# Patient Record
Sex: FEMALE | Race: OTHER | HISPANIC OR LATINO | URBAN - METROPOLITAN AREA
[De-identification: names, ages, dates, MRNs, and addresses within clinical notes are randomized per-mention and may not be internally consistent; named-entity substitution may affect disease eponyms.]

---

## 2017-07-14 ENCOUNTER — EMERGENCY (EMERGENCY)
Facility: HOSPITAL | Age: 62
LOS: 1 days | Discharge: HOME | End: 2017-07-14
Attending: EMERGENCY MEDICINE | Admitting: EMERGENCY MEDICINE
Payer: MEDICAID

## 2017-07-14 VITALS
OXYGEN SATURATION: 96 % | TEMPERATURE: 98 F | SYSTOLIC BLOOD PRESSURE: 132 MMHG | DIASTOLIC BLOOD PRESSURE: 71 MMHG | RESPIRATION RATE: 14 BRPM | WEIGHT: 119.93 LBS | HEART RATE: 69 BPM

## 2017-07-14 VITALS
OXYGEN SATURATION: 97 % | TEMPERATURE: 98 F | HEART RATE: 70 BPM | SYSTOLIC BLOOD PRESSURE: 128 MMHG | RESPIRATION RATE: 14 BRPM | DIASTOLIC BLOOD PRESSURE: 70 MMHG
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
## 2017-07-14 DIAGNOSIS — S43.004A UNSPECIFIED DISLOCATION OF RIGHT SHOULDER JOINT, INITIAL ENCOUNTER: ICD-10-CM

## 2017-07-14 DIAGNOSIS — Z88.6 ALLERGY STATUS TO ANALGESIC AGENT: ICD-10-CM
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
## 2017-07-14 PROCEDURE — 73030 X-RAY EXAM OF SHOULDER: CPT

## 2017-07-14 PROCEDURE — 73030 X-RAY EXAM OF SHOULDER: CPT | Mod: 26,RT

## 2017-07-14 PROCEDURE — 23650 CLTX SHO DSLC W/MNPJ WO ANES: CPT

## 2017-07-14 PROCEDURE — 99285 EMERGENCY DEPT VISIT HI MDM: CPT | Mod: 25

## 2017-07-14 PROCEDURE — 23650 CLTX SHO DSLC W/MNPJ WO ANES: CPT | Mod: RT

## 2017-07-14 PROCEDURE — 99284 EMERGENCY DEPT VISIT MOD MDM: CPT | Mod: 25

## 2017-07-14 PROCEDURE — 73080 X-RAY EXAM OF ELBOW: CPT

## 2017-07-14 PROCEDURE — 73080 X-RAY EXAM OF ELBOW: CPT | Mod: 26,RT

## 2017-07-14 RX ORDER — OXYCODONE AND ACETAMINOPHEN 5; 325 MG/1; MG/1
1 TABLET ORAL ONCE
Qty: 0 | Refills: 0 | Status: DISCONTINUED | OUTPATIENT
Start: 2017-07-14 | End: 2017-07-14

## 2017-07-14 RX ADMIN — OXYCODONE AND ACETAMINOPHEN 1 TABLET(S): 5; 325 TABLET ORAL at 15:23

## 2017-07-14 RX ADMIN — OXYCODONE AND ACETAMINOPHEN 1 TABLET(S): 5; 325 TABLET ORAL at 14:53

## 2017-07-14 NOTE — ED PROVIDER NOTE - OBJECTIVE STATEMENT
family xlator per pt request  pt c/o right elbow pain s/p fall onto right elbow while slipped off small stool. no head injury, ha, neck or abck pain, leg pain, weakness, numbness, cp, sob, abd pain.  pmd - candida catucci

## 2017-07-14 NOTE — ED PROVIDER NOTE - CONSTITUTIONAL, MLM
normal... Well appearing, well nourished, awake, alert, oriented to person, place, time/situation, crying and in mild distress.

## 2017-07-14 NOTE — ED ADULT NURSE NOTE - OBJECTIVE STATEMENT
Pt presents to the subacute area s/p fall c/o right elbow pain, skin warm and dry + sensation, + capillary refill < 2 sec, + mobility of the fingers.

## 2017-07-14 NOTE — ED PROVIDER NOTE - CARE PLAN
Principal Discharge DX:	Elbow injury, right, initial encounter Principal Discharge DX:	Shoulder dislocation, right, initial encounter

## 2017-07-14 NOTE — CONSULT NOTE ADULT - ASSESSMENT
A/P: 62F s/p R shoulder dislocation  Pain control  NWB RUE, Sling    Ice as needed  All imaging reviewed  All questions answered with patient  Encourage motion of elbow/fingers  D/W patient, no need for acute orthopedic surgical intervention  Will discuss with attending and advise if plan changes  Pt to follow up with Dr. Ferrer in 1-2 weeks, call office for appt  Ortho stable for DC

## 2017-07-14 NOTE — CONSULT NOTE ADULT - SUBJECTIVE AND OBJECTIVE BOX
62F RHD c/o R shoulder pain s/p controlled fall. Pt states she was cleaning and stumbled, twisting her R arm. Pt primarily Tajik speaking, understands basic english. Translation done through her friend. Denies HS/LOC. Denies numbness/tingling.     PAST MEDICAL & SURGICAL HISTORY:  No pertinent past medical history    Denies medications    Allergies    aspirin (Other)    Intolerances    Vital Signs Last 24 Hrs  T(C): 36.6 (14 Jul 2017 14:34), Max: 36.6 (14 Jul 2017 14:34)  T(F): 97.8 (14 Jul 2017 14:34), Max: 97.8 (14 Jul 2017 14:34)  HR: 69 (14 Jul 2017 14:34) (69 - 69)  BP: 132/71 (14 Jul 2017 14:34) (132/71 - 132/71)  BP(mean): --  RR: 14 (14 Jul 2017 14:34) (14 - 14)  SpO2: 96% (14 Jul 2017 14:34) (96% - 96%)    PE: Gen: NAD  RUE: Skin intact, no edema/ecchymosis, SILT C5-T1, SILT over R deltoid, +AIN/PIN/R/U/M, +radial pulse, cap refill brisk, compartments soft, + sulcus sign, humeral head palpable anteriorly    Secondary Survey: No TTP over bony prominences, SILT, palpable pulses, full/painless range of motion, compartments soft     XR R Shldr: Anterior inferior shoulder dislocation      Procedure note: Using sterile technique, the R shoulder joint was injected with 20cc lidocaine. Shoulder was reduced using traction/counter traction. Patient tolerated procedure well. Post reduction films confirmed reduction. NVI following procedure. No complications.

## 2018-03-23 ENCOUNTER — EMERGENCY (EMERGENCY)
Facility: HOSPITAL | Age: 63
LOS: 1 days | Discharge: HOME | End: 2018-03-23
Attending: EMERGENCY MEDICINE | Admitting: EMERGENCY MEDICINE
Payer: MEDICAID

## 2018-03-23 VITALS
RESPIRATION RATE: 16 BRPM | HEART RATE: 78 BPM | SYSTOLIC BLOOD PRESSURE: 138 MMHG | DIASTOLIC BLOOD PRESSURE: 70 MMHG | OXYGEN SATURATION: 96 %

## 2018-03-23 VITALS
HEIGHT: 60 IN | SYSTOLIC BLOOD PRESSURE: 153 MMHG | HEART RATE: 82 BPM | DIASTOLIC BLOOD PRESSURE: 78 MMHG | RESPIRATION RATE: 16 BRPM | TEMPERATURE: 98 F | WEIGHT: 125 LBS | OXYGEN SATURATION: 96 %

## 2018-03-23 PROCEDURE — 23650 CLTX SHO DSLC W/MNPJ WO ANES: CPT | Mod: RT

## 2018-03-23 PROCEDURE — 99285 EMERGENCY DEPT VISIT HI MDM: CPT | Mod: 25

## 2018-03-23 PROCEDURE — 73030 X-RAY EXAM OF SHOULDER: CPT

## 2018-03-23 PROCEDURE — 73020 X-RAY EXAM OF SHOULDER: CPT

## 2018-03-23 PROCEDURE — 73030 X-RAY EXAM OF SHOULDER: CPT | Mod: 26,RT

## 2018-03-23 PROCEDURE — 99283 EMERGENCY DEPT VISIT LOW MDM: CPT

## 2018-03-23 PROCEDURE — 73020 X-RAY EXAM OF SHOULDER: CPT | Mod: 26,RT

## 2018-03-23 PROCEDURE — 73030 X-RAY EXAM OF SHOULDER: CPT | Mod: 26,77,RT

## 2018-03-23 RX ORDER — OXYCODONE AND ACETAMINOPHEN 5; 325 MG/1; MG/1
1 TABLET ORAL ONCE
Qty: 0 | Refills: 0 | Status: DISCONTINUED | OUTPATIENT
Start: 2018-03-23 | End: 2018-03-23

## 2018-03-23 RX ADMIN — OXYCODONE AND ACETAMINOPHEN 1 TABLET(S): 5; 325 TABLET ORAL at 11:50

## 2018-03-23 RX ADMIN — OXYCODONE AND ACETAMINOPHEN 1 TABLET(S): 5; 325 TABLET ORAL at 12:20

## 2018-03-23 NOTE — ED PROVIDER NOTE - OBJECTIVE STATEMENT
63 y/o female with no significant PMHx presents today with c/o right shoulder pain x 1 hour. pt states she was reaching for an object in which she noted pain and decreased ROM. pt states she has hx of dislocation 8 months ago. pt describes pain as sharp, non-radiating, and currently 10/10. pt denies fall, trauma, CP, SOB, extremity numbess/weakness, or any other complaints.

## 2018-03-23 NOTE — ED ADULT TRIAGE NOTE - CHIEF COMPLAINT QUOTE
Pt reports possible Right shoulder dislocation after reaching out with arm, denies falls or traumas, states that a previous time this happened she came here and was told she had a shoulder dislocation

## 2018-03-23 NOTE — ED PROVIDER NOTE - CONDUCTED A DETAILED DISCUSSION WITH PATIENT AND/OR GUARDIAN REGARDING, MDM
radiology results return to ED if symptoms worsen, persist or questions arise/radiology results/need for outpatient follow-up

## 2018-03-23 NOTE — ED PROVIDER NOTE - MUSCULOSKELETAL MINIMAL EXAM
(+) right shoulder TTP, (+) limited active ROM, no wrist drop, FROM distal extremity, hand  intact, pulses intact

## 2018-03-23 NOTE — ED PROVIDER NOTE - PROGRESS NOTE DETAILS
Ortho team reduced shoulder. Noted significant improvement. Advised to provide sling, and f/u with Dr. Ferrer in 1 week.

## 2018-03-23 NOTE — ED ADULT NURSE NOTE - CAS DISCH CONDITION
Blepharoplasty Recommended. Improved Improved/pt seen, examined, eval and tx'd by ortho residents, post reduction xray done, right arm placed in a sling, + neurovasculars checks done

## 2018-03-23 NOTE — ED PROVIDER NOTE - ATTENDING CONTRIBUTION TO CARE
pt with hx right shoulder dislocation c/o pain to shoulder s/p reaching for something with right arm and feeling joint pop out of place. no weakness or numbness or any other injury.  rue shoulder tender, decreased rom, distal n/v intact, cap refill < 2 sec

## 2018-03-23 NOTE — ED PROVIDER NOTE - MEDICAL DECISION MAKING DETAILS
63 y/o female with no significant PMHx presents today with shoulder pain with suspected dislocation. Discussed with Dr. hester, pending Xray. Likely reduction. 63 y/o female with no significant PMHx presents today with shoulder pain with suspected dislocation. Discussed with Dr. hester, pending Xray. Likely reduction.    Shoulder reduced by ortho team. Sling applied, advised to follow up in 1 week with Dr beltrán. Pt educated on nature of condition and importance to follow up with PCp and specialist.

## 2018-03-23 NOTE — CONSULT NOTE ADULT - SUBJECTIVE AND OBJECTIVE BOX
62yFemale c/o R shoulder pain  s/p moving her arm extended and outreached and felt a pop. The patient denies any trauma but reports that she dislocated her right shoulder in august of 2017. The patient speaks Greek and translation was performed at patient request by her niece. Patient denies head hit or LOC. Patient denies numbness or tingling in the RUE. Patient denies any other injuries.    PMH:  Shoulder dislocation, right,   No pertinent past medical history    PSH:    AH:  aspirin (Other)    Meds: See med rec    T(C): 36.4 (03-23-18 @ 11:15)  HR: 82 (03-23-18 @ 11:15)  BP: 153/78 (03-23-18 @ 11:15)  RR: 16 (03-23-18 @ 11:15)  SpO2: 96% (03-23-18 @ 11:15)  Wt(kg): --    PE RUE:  Skin intact, + sulcus sign, SILT C5-T1, +AIN/PIN/Ulnar/Radial/Musc/Median, +elbow/wrist ROM, shoulder ROM limited 2/2 pain, +radial pulse, soft compartments.    Secondary:  No TTP over bony landmarks, SILT BL, ROM intact BL, distal pulses palpable.    Imaging:  XR demonstrating R shoulder anterior dislocation    Procedure: Right shoulder injected with 20cc 1% lidocaine, right shoulder reduced with traction counter traction, post reduction examination unchanged, post-reduction imaging shows reduction of glenohumeral joint. Patient tolerated procedure well, no complications.

## 2018-03-23 NOTE — CONSULT NOTE ADULT - ASSESSMENT
A/P: 62 Female with Right shoulder recurrent dislocation reduced successfully  Pain control  Ice as needed  NWB RUE in sling  Encourage movement of elbow and fingers  No acute orthopedic surgical intervention at this time  Ortho stable for discharge with follow up with Dr. Ferrer in 1-2 weeks call office for appointment  All questions answered

## 2018-12-18 ENCOUNTER — EMERGENCY (EMERGENCY)
Facility: HOSPITAL | Age: 63
LOS: 1 days | End: 2018-12-18
Attending: EMERGENCY MEDICINE
Payer: MEDICARE

## 2018-12-18 VITALS
HEART RATE: 76 BPM | DIASTOLIC BLOOD PRESSURE: 83 MMHG | OXYGEN SATURATION: 92 % | TEMPERATURE: 98 F | SYSTOLIC BLOOD PRESSURE: 136 MMHG | RESPIRATION RATE: 15 BRPM

## 2018-12-18 PROBLEM — S43.004A UNSPECIFIED DISLOCATION OF RIGHT SHOULDER JOINT, INITIAL ENCOUNTER: Chronic | Status: ACTIVE | Noted: 2018-03-23

## 2018-12-18 PROCEDURE — 99284 EMERGENCY DEPT VISIT MOD MDM: CPT | Mod: 25

## 2018-12-18 PROCEDURE — 23650 CLTX SHO DSLC W/MNPJ WO ANES: CPT | Mod: RT

## 2018-12-18 PROCEDURE — 73030 X-RAY EXAM OF SHOULDER: CPT | Mod: 26,RT,76

## 2018-12-18 PROCEDURE — 73030 X-RAY EXAM OF SHOULDER: CPT

## 2018-12-18 PROCEDURE — 23650 CLTX SHO DSLC W/MNPJ WO ANES: CPT

## 2018-12-18 RX ORDER — OXYCODONE AND ACETAMINOPHEN 5; 325 MG/1; MG/1
1 TABLET ORAL ONCE
Qty: 0 | Refills: 0 | Status: DISCONTINUED | OUTPATIENT
Start: 2018-12-18 | End: 2018-12-18

## 2018-12-18 RX ORDER — LIDOCAINE HCL 20 MG/ML
10 VIAL (ML) INJECTION ONCE
Qty: 0 | Refills: 0 | Status: DISCONTINUED | OUTPATIENT
Start: 2018-12-18 | End: 2018-12-22

## 2018-12-18 RX ADMIN — OXYCODONE AND ACETAMINOPHEN 1 TABLET(S): 5; 325 TABLET ORAL at 09:20

## 2018-12-18 NOTE — ED PROVIDER NOTE - CARE PROVIDER_API CALL
Carlos Harding), Orthopaedic Surgery  [address] [place] [address]  Phone: [phone]  Fax: [phone]

## 2018-12-18 NOTE — ED PROVIDER NOTE - OBJECTIVE STATEMENT
[id number] Charlie  62 yo female hx of right shoulder dislocation c/o right shoulder dislocation s/p mechanical fall out of bed, was holding onto railing next to her with her right hand.  BIBEMS for evaluation.  Patient Iranian speaking.  Denies any head injury, neck pain.  No chest pain, no shortness of breath, no other complaints.

## 2018-12-18 NOTE — ED PROVIDER NOTE - NSFOLLOWUPINSTRUCTIONS_ED_ALL_ED_FT
1) Follow-up with Dr. Harding. Call today / next business day for prompt follow-up.  2) Return to Emergency room for any worsening or persistent pain, weakness, fever, or any other concerning symptoms.  3) See attached instruction sheets for additional information, including information regarding signs and symptoms to look out for, reasons to seek immediate care and other important instructions.  4) Motrin 600mg every 6 hours as needed for pain.  Take with food.

## 2018-12-18 NOTE — ED PROVIDER NOTE - PHYSICAL EXAMINATION
Gen: Alert, NAD  Head/eyes: NC/AT, PERRL, EOMI, normal lids/conjunctiva, no scleral icterus  ENT: Bilateral TM WNL, normal hearing, patent oropharynx without erythema/exudate, uvula midline, no peritonsillar abscess, no tongue/uvula swelling  Neck: supple, no tenderness/meningismus/JVD, Trachea midline  Pulm: Bilateral clear BS, normal resp effort, no wheeze/stridor/retractions  CV: RRR, no M/R/G, +2 dist pulses (radial, pedal DP/PT, popliteal)  Abd: soft, NT/ND, +BS, no guarding/rebound tenderness  Musculoskeletal: no edema/erythema/cyanosis, FROM in all extremities except of right shoulder secondary to pain, no C/T/L spine ttp  Skin: no rash, no vesicles, no petechaie, no ecchymosis, no swelling  Neuro: AAOx3, CN 2-12 intact, normal sensation, 5/5 motor strength in all extremities, normal gait, no dysmetria

## 2018-12-18 NOTE — ED ADULT NURSE NOTE - CHPI ED NUR SYMPTOMS NEG
no numbness/no bruising/no deformity/no fever/no difficulty bearing weight/no abrasion/no stiffness/no tingling/no back pain

## 2018-12-18 NOTE — ED PROVIDER NOTE - NS ED ROS FT

## 2019-12-13 ENCOUNTER — EMERGENCY (EMERGENCY)
Facility: HOSPITAL | Age: 64
LOS: 1 days | Discharge: HOME | End: 2019-12-13
Attending: EMERGENCY MEDICINE | Admitting: EMERGENCY MEDICINE
Payer: MEDICARE

## 2019-12-13 VITALS
SYSTOLIC BLOOD PRESSURE: 149 MMHG | TEMPERATURE: 98 F | DIASTOLIC BLOOD PRESSURE: 82 MMHG | OXYGEN SATURATION: 93 % | HEART RATE: 86 BPM | RESPIRATION RATE: 15 BRPM | HEIGHT: 59 IN | WEIGHT: 126.1 LBS

## 2019-12-13 VITALS
HEART RATE: 83 BPM | RESPIRATION RATE: 16 BRPM | SYSTOLIC BLOOD PRESSURE: 115 MMHG | TEMPERATURE: 98 F | OXYGEN SATURATION: 98 % | DIASTOLIC BLOOD PRESSURE: 71 MMHG

## 2019-12-13 PROCEDURE — 99285 EMERGENCY DEPT VISIT HI MDM: CPT

## 2019-12-13 PROCEDURE — 23650 CLTX SHO DSLC W/MNPJ WO ANES: CPT

## 2019-12-13 PROCEDURE — 73030 X-RAY EXAM OF SHOULDER: CPT

## 2019-12-13 PROCEDURE — 73020 X-RAY EXAM OF SHOULDER: CPT

## 2019-12-13 PROCEDURE — 73030 X-RAY EXAM OF SHOULDER: CPT | Mod: 26,76,RT

## 2019-12-13 PROCEDURE — 23650 CLTX SHO DSLC W/MNPJ WO ANES: CPT | Mod: RT

## 2019-12-13 PROCEDURE — 99284 EMERGENCY DEPT VISIT MOD MDM: CPT | Mod: 25

## 2019-12-13 PROCEDURE — 96374 THER/PROPH/DIAG INJ IV PUSH: CPT | Mod: XU

## 2019-12-13 RX ORDER — MORPHINE SULFATE 50 MG/1
4 CAPSULE, EXTENDED RELEASE ORAL ONCE
Refills: 0 | Status: DISCONTINUED | OUTPATIENT
Start: 2019-12-13 | End: 2019-12-13

## 2019-12-13 RX ADMIN — MORPHINE SULFATE 4 MILLIGRAM(S): 50 CAPSULE, EXTENDED RELEASE ORAL at 17:20

## 2019-12-13 NOTE — ED PROVIDER NOTE - CARE PROVIDER_API CALL
Edgar Santa)  Orthopaedic Surgery  [address] [place] [address]  Phone: [phone]  Fax: [phone]  Follow Up Time:

## 2019-12-13 NOTE — ED ADULT NURSE NOTE - NSIMPLEMENTINTERV_GEN_ALL_ED
Implemented All Universal Safety Interventions:  [place] to call system. Call bell, personal items and telephone within reach. Instruct patient to call for assistance. Room bathroom lighting operational. Non-slip footwear when patient is off stretcher. Physically safe environment: no spills, clutter or unnecessary equipment. Stretcher in lowest position, wheels locked, appropriate side rails in place.

## 2019-12-13 NOTE — CONSULT NOTE ADULT - SUBJECTIVE AND OBJECTIVE BOX
64yFemale c/o R shoulder pain  s/p outstretching her arm. Patient has had prior dislocations before. Patient denies head strike or LOC. Patient denies numbness or tingling in the RUE. Patient denies any other injuries.    PMH:  Shoulder dislocation, right, initial encounter  No pertinent past medical history    PSH:    AH:  aspirin (Other)    Meds: See med rec    T(C): 36.6 (12-13-19 @ 16:57)  HR: 86 (12-13-19 @ 16:57)  BP: 149/82 (12-13-19 @ 16:57)  RR: 15 (12-13-19 @ 16:57)  SpO2: 93% (12-13-19 @ 16:57)  Wt(kg): --    Physical Exam:  GEN: NAD  RUE:  Skin intact, + sulcus sign, SILT C5-T1, +AIN/PIN/Ulnar/Radial/Musc/Median, +elbow/wrist ROM, shoulder ROM limited 2/2 pain, +radial pulse, soft compartments.    Secondary:  No TTP over bony landmarks, SILT BL, ROM intact BL, distal pulses palpable.    Imaging:  XR demonstrating R anterior shoulder dislocation     Procedure:  Under aspetic conditions, 20cc 1% lidocaine was injected into the affected shoulder joint. Closed reduction was then performed and the affected extremity was immobilized. The patient tolerated the procedure well and there we no complications. The patient was neurovascularly intact following reduction. Post-reduction xrays demonstrated a reduced shoulder. 64yFemale c/o R shoulder pain  s/p outstretching her arm. Patient is right handed. Patient has had prior dislocations before. Patient denies head strike or LOC. Patient denies numbness or tingling in the RUE. Patient denies any other injuries.    PMH:  Shoulder dislocation, right, initial encounter  No pertinent past medical history    PSH:    AH:  aspirin (Other)    Meds: See med rec    T(C): 36.6 (12-13-19 @ 16:57)  HR: 86 (12-13-19 @ 16:57)  BP: 149/82 (12-13-19 @ 16:57)  RR: 15 (12-13-19 @ 16:57)  SpO2: 93% (12-13-19 @ 16:57)  Wt(kg): --    Physical Exam:  GEN: NAD  RUE:  Skin intact, + sulcus sign, + apprehension sign, SILT C5-T1, +AIN/PIN/Ulnar/Radial/Musc/Median, +elbow/wrist ROM, shoulder ROM limited 2/2 pain, +radial pulse, soft compartments.    Secondary:  No TTP over bony landmarks, SILT BL, ROM intact BL, distal pulses palpable.    Imaging:  XR demonstrating R anterior shoulder dislocation     Procedure:  Under aspetic conditions, 20cc 1% lidocaine was injected into the affected shoulder joint. Closed reduction was then performed and the affected extremity was immobilized. The patient tolerated the procedure well and there we no complications. The patient was neurovascularly intact following reduction. Post-reduction xrays demonstrated a reduced shoulder.

## 2019-12-13 NOTE — CONSULT NOTE ADULT - ASSESSMENT
64 F with R anterior shoulder reduction s/p closed reduction    NWB RUE in shoulder immobilizer  ICE affected extremity  Analgesia as needed  No shoulder flexion, abduction external rotating  Patient counseled on possible need of future surgery due to prior history of shoulder dislocation, patient demonstrated understanding  Patient may follow up with Dr. Santa in office, please call office to make an appointment  Patient ortho stable for DC  No further orthopedic interventions at this time  Will discuss with attending and advise further if plan changes 64 F with R anterior shoulder reduction s/p closed reduction    NWB RUE in shoulder immobilizer  ICE affected extremity  Analgesia as needed  No shoulder flexion, abduction external rotating  Patient counseled on possible need of future surgery due to prior history of multiple shoulder dislocation, patient demonstrated understanding  Patient may follow up with Dr. Santa in office, please call office to make an appointment  Patient ortho stable for DC  No further orthopedic interventions at this time  Will discuss with attending and advise further if plan changes

## 2019-12-13 NOTE — ED PROVIDER NOTE - PHYSICAL EXAMINATION
Gen: Alert, NAD  Head/eyes: NC/AT, PERRL  ENT: airway patent  Neck: supple, no tenderness/meningismus/JVD, Trachea midline  Pulm/lung: Bilateral clear BS, normal resp effort, no wheeze/stridor/retractions  CV/heart: RRR, no M/R/G, +2 dist pulses (radial, pedal DP/PT, popliteal)  GI/Abd: soft, NT/ND, +BS, no guarding/rebound tenderness  Musculoskeletal: no edema/erythema/cyanosis, FROM in all extremities except right shoulder secondary to pain no C/T/L spine ttp  Skin: no rash, no vesicles, no petechaie, no ecchymosis, no swelling  Neuro: AAOx3, CN 2-12 intact, normal sensation, 5/5 motor strength in all extremities, normal gait, no dysmetria

## 2019-12-13 NOTE — ED PROVIDER NOTE - PATIENT PORTAL LINK FT
You can access the FollowMyHealth Patient Portal offered by [facility] by registering at the following website: [email or web address]. By joining [employer]’s FollowMyHealth portal, you will also be able to view your health information using other applications (apps) compatible with our system.

## 2019-12-13 NOTE — ED PROVIDER NOTE - NSFOLLOWUPINSTRUCTIONS_ED_ALL_ED_FT
1) Follow-up with Dr. Martin. Call today / next business day for prompt follow-up.  2) Return to Emergency room for any worsening or persistent pain, weakness, fever, or any other concerning symptoms.  3) See attached instruction sheets for additional information, including information regarding signs and symptoms to look out for, reasons to seek immediate care and other important instructions.  4) Motrin 600mg every 6 hours as needed for pain 1) Follow-up with Dr. Santa. Call today / next business day for prompt follow-up.  2) Return to Emergency room for any worsening or persistent pain, weakness, fever, or any other concerning symptoms.  3) See attached instruction sheets for additional information, including information regarding signs and symptoms to look out for, reasons to seek immediate care and other important instructions.  4) Motrin 600mg every 6 hours as needed for pain

## 2019-12-13 NOTE — ED PROVIDER NOTE - OBJECTIVE STATEMENT
63 yo female hx of right shoulder dislocation s/p closing door with right hand c/o sudden onset right shoulder pain, nonradiating, moderate to severe, no medications taken for this, feels like her previous dislocation

## 2019-12-13 NOTE — ED ADULT NURSE NOTE - OBJECTIVE STATEMENT
patient came in ED with c/o right shoulder pain. patient states she was closing the door of her car when she felt the shoulder pop out. 10/10 pain level. sling provided.

## 2019-12-26 ENCOUNTER — FORM ENCOUNTER (OUTPATIENT)
Age: 64
End: 2019-12-26

## 2019-12-26 PROBLEM — Z00.00 ENCOUNTER FOR PREVENTIVE HEALTH EXAMINATION: Status: ACTIVE | Noted: 2019-12-26

## 2019-12-27 ENCOUNTER — APPOINTMENT (OUTPATIENT)
Dept: MRI IMAGING | Facility: CLINIC | Age: 64
End: 2019-12-27

## 2019-12-27 ENCOUNTER — OUTPATIENT (OUTPATIENT)
Dept: OUTPATIENT SERVICES | Facility: HOSPITAL | Age: 64
LOS: 1 days | End: 2019-12-27
Payer: MEDICARE

## 2019-12-27 ENCOUNTER — APPOINTMENT (OUTPATIENT)
Dept: ORTHOPEDIC SURGERY | Facility: CLINIC | Age: 64
End: 2019-12-27
Payer: MEDICARE

## 2019-12-27 VITALS
HEART RATE: 74 BPM | BODY MASS INDEX: 23.56 KG/M2 | SYSTOLIC BLOOD PRESSURE: 116 MMHG | HEIGHT: 60 IN | WEIGHT: 120 LBS | DIASTOLIC BLOOD PRESSURE: 77 MMHG

## 2019-12-27 DIAGNOSIS — Z56.0 UNEMPLOYMENT, UNSPECIFIED: ICD-10-CM

## 2019-12-27 DIAGNOSIS — Z72.3 LACK OF PHYSICAL EXERCISE: ICD-10-CM

## 2019-12-27 DIAGNOSIS — Z78.9 OTHER SPECIFIED HEALTH STATUS: ICD-10-CM

## 2019-12-27 DIAGNOSIS — S43.006A UNSPECIFIED DISLOCATION OF UNSPECIFIED SHOULDER JOINT, INITIAL ENCOUNTER: ICD-10-CM

## 2019-12-27 DIAGNOSIS — Z00.8 ENCOUNTER FOR OTHER GENERAL EXAMINATION: ICD-10-CM

## 2019-12-27 PROCEDURE — 73221 MRI JOINT UPR EXTREM W/O DYE: CPT | Mod: 26,RT

## 2019-12-27 PROCEDURE — 73221 MRI JOINT UPR EXTREM W/O DYE: CPT

## 2019-12-27 PROCEDURE — 99204 OFFICE O/P NEW MOD 45 MIN: CPT

## 2019-12-27 SDOH — ECONOMIC STABILITY - INCOME SECURITY: UNEMPLOYMENT, UNSPECIFIED: Z56.0

## 2019-12-29 PROBLEM — S43.006A DISLOCATED SHOULDER: Status: ACTIVE | Noted: 2019-12-27

## 2020-01-02 ENCOUNTER — APPOINTMENT (OUTPATIENT)
Dept: ORTHOPEDIC SURGERY | Facility: CLINIC | Age: 65
End: 2020-01-02
Payer: MEDICARE

## 2020-01-02 VITALS
BODY MASS INDEX: 23.56 KG/M2 | HEIGHT: 60 IN | WEIGHT: 120 LBS | HEART RATE: 98 BPM | SYSTOLIC BLOOD PRESSURE: 120 MMHG | DIASTOLIC BLOOD PRESSURE: 74 MMHG

## 2020-01-02 DIAGNOSIS — M24.411 RECURRENT DISLOCATION, RIGHT SHOULDER: ICD-10-CM

## 2020-01-02 DIAGNOSIS — S46.011D STRAIN OF MUSCLE(S) AND TENDON(S) OF THE ROTATOR CUFF OF RIGHT SHOULDER, SUBSEQUENT ENCOUNTER: ICD-10-CM

## 2020-01-02 PROCEDURE — 99213 OFFICE O/P EST LOW 20 MIN: CPT

## 2020-01-02 NOTE — PHYSICAL EXAM
[Rad] : radial 2+ and symmetric bilaterally [Normal] : Alert and in no acute distress [Poor Appearance] : well-appearing [Acute Distress] : not in acute distress [Obese] : not obese [de-identified] : The patient has no respiratory distress. Mood and affect are normal. The patient is alert and oriented to person, place and time.\par Examination of the cervical spine demonstrates no tenderness, no deformity and no muscle spasm. Cervical spine rotation is 60° to the right, 60° to the left, 75° of extension and 45° of flexion. Neurologic exam of the upper extremities reveals intact sensation to light touch. Motor function is 5 over 5 in all groups. Deep tendon reflexes are 2+ and equal at the biceps, triceps and brachioradialis.\par Examination of the shoulders demonstrates no swelling or deformity.  The right shoulder is clinically located.  She has restricted elevation and she has apprehension with abduction and external rotation.  Drop arm test is negative.  Belly press test is negative.  External rotation strength is good.  The elbows are stable and nontender.  The skin is intact.  There is no lymphedema. [de-identified] : EXAM: MR SHOULDER RT \par \par \par PROCEDURE DATE: 12/27/2019 \par \par \par \par INTERPRETATION: \par RIGHT SHOULDER MRI \par \par CLINICAL INFORMATION: Right shoulder pain. Injury on 12/13/2019. Prior \par dislocation. \par TECHNIQUE: Multiplanar, multisequence MR imaging was obtained of the right \par shoulder. \par COMPARISON: Shoulder radiographs dated 12/13/2019 \par \par FINDINGS: \par \par ROTATOR CUFF: There is a full-thickness, full width tear of the \par supraspinatus that extends into the anterior midportion of the \par infraspinatus. There is moderate retraction of the supraspinatus measuring \par approximately 4.2 cm. Suspect a rounded fragment of bone within the \par retracted fibers of the supraspinatus posteriorly measuring up to 0.8 cm in \par AP diameter. The subscapularis and teres minor are intact. \par BICEPS TENDON: The intra-articular biceps tendon is poorly seen suggesting \par high-grade partial tearing \par AC JOINT: Moderate lateral downsloping of the acromion. \par GLENOID LABRUM AND GLENOHUMERAL LIGAMENTS: Blunting and chronic appearing \par tearing of the posterior labrum extending superiorly. \par GLENOHUMERAL CARTILAGE AND SUBCHONDRAL BONE: Preserved \par SYNOVIUM/JOINT FLUID: Small minimal joint effusion. Small fluid in the \par subacromial/subdeltoid bursa. \par BONE MARROW: Acute/subacute Hill-Sachs deformity at the posterolateral \par humeral head measuring to 1.6 x 1.4 cm. Moderate edema within the right. No \par definite osseous Bankart injury though limited by MRI technique. \par MUSCLES: Moderate to severe atrophy of the infraspinatus. Mild atrophy of \par the supraspinatus. \par NEUROVASCULAR STRUCTURES: Preserved \par SUBCUTANEOUS SOFT TISSUES: There is no soft tissue swelling. \par \par IMPRESSION: \par \par 1. Subacute to acute shoulder dislocation with relocation and residual \par moderate Hill-Sachs deformity with underlying edema. No definite osseous \par Bankart injury. \par 2. Atrophy of the supraspinatus and infraspinatus. \par 3. Full-thickness, full tear in the supraspinatus extending into the \par anterior portion of the infraspinatus. Retraction as detailed above. \par 4. High-grade partial tear of the intra-articular biceps tendon. \par 5. Mild subacromial/subdeltoid bursitis. \par \par NICOLE MILLER M.D., ATTENDING RADIOLOGIST \par This document has been electronically signed. Dec 30 2019 2:00PM

## 2020-01-02 NOTE — HISTORY OF PRESENT ILLNESS
[de-identified] : This 65 yo RHD woman presents with c/o decreased ROM on right shoulder since 12/13/19. Pt states she was trying to close the door of her car, and after pushing the door she dislocated her right shoulder. She states that in 2017 she had her right shoulder dislocated after a fall. Pt states she had 4 similar episodes in the past. On 12/13/19 pt went to [place] ER and her shoulder was reducede. Xrays were done. Pt is not taking meds for pain at this time. Pt states he has no pain, she denies radiating pain, numbness,tingling on RUE.  She had an MRI on 12/27/19 and she presents to discuss results.\par

## 2020-01-02 NOTE — DISCUSSION/SUMMARY
[de-identified] : The patient has recurrent dislocation of the right shoulder on the basis of rotator cuff tear.  She has had 4 dislocations and no improvement with physical therapy.  I have discussed the pathology, natural history and treatment options with her.  I have recommended she and she has accepted arthroscopic surgery and rotator cuff repair.  I have gone over the procedure, risks, benefits and alternatives.  We have discussed the need for postoperative immobilization and physical therapy.  She understands and wishes to proceed.

## 2020-03-27 ENCOUNTER — APPOINTMENT (OUTPATIENT)
Dept: ORTHOPEDIC SURGERY | Facility: HOSPITAL | Age: 65
End: 2020-03-27

## 2020-05-14 NOTE — ED PROVIDER NOTE - NEUROLOGICAL, MLM
Called and spoke to Patient after verifying last name and date of birth. Pt confirmed it is OK TO WAIT FOR DR. FINE'S RETURN ON Monday 5/18/20.    Fanta Hilton RN .............. 5/14/2020  11:43 AM     Alert and oriented, no focal deficits, no motor or sensory deficits.

## 2021-11-19 ENCOUNTER — EMERGENCY (EMERGENCY)
Facility: HOSPITAL | Age: 66
LOS: 1 days | Discharge: HOME | End: 2021-11-19
Attending: EMERGENCY MEDICINE | Admitting: EMERGENCY MEDICINE
Payer: MEDICARE

## 2021-11-19 VITALS
OXYGEN SATURATION: 98 % | HEIGHT: 59 IN | RESPIRATION RATE: 16 BRPM | WEIGHT: 123.02 LBS | TEMPERATURE: 97 F | DIASTOLIC BLOOD PRESSURE: 74 MMHG | HEART RATE: 74 BPM | SYSTOLIC BLOOD PRESSURE: 145 MMHG

## 2021-11-19 VITALS
TEMPERATURE: 98 F | HEART RATE: 70 BPM | SYSTOLIC BLOOD PRESSURE: 122 MMHG | RESPIRATION RATE: 16 BRPM | OXYGEN SATURATION: 98 % | DIASTOLIC BLOOD PRESSURE: 72 MMHG

## 2021-11-19 PROCEDURE — 73020 X-RAY EXAM OF SHOULDER: CPT

## 2021-11-19 PROCEDURE — 96374 THER/PROPH/DIAG INJ IV PUSH: CPT | Mod: XU

## 2021-11-19 PROCEDURE — 99284 EMERGENCY DEPT VISIT MOD MDM: CPT

## 2021-11-19 PROCEDURE — 73030 X-RAY EXAM OF SHOULDER: CPT | Mod: 26,RT,76

## 2021-11-19 PROCEDURE — 96375 TX/PRO/DX INJ NEW DRUG ADDON: CPT | Mod: XU

## 2021-11-19 PROCEDURE — 73030 X-RAY EXAM OF SHOULDER: CPT

## 2021-11-19 PROCEDURE — 23650 CLTX SHO DSLC W/MNPJ WO ANES: CPT | Mod: RT

## 2021-11-19 PROCEDURE — 99284 EMERGENCY DEPT VISIT MOD MDM: CPT | Mod: 25

## 2021-11-19 PROCEDURE — 73020 X-RAY EXAM OF SHOULDER: CPT | Mod: 26,59,RT

## 2021-11-19 RX ORDER — OXYCODONE AND ACETAMINOPHEN 5; 325 MG/1; MG/1
1 TABLET ORAL
Qty: 15 | Refills: 0
Start: 2021-11-19

## 2021-11-19 RX ORDER — MORPHINE SULFATE 50 MG/1
4 CAPSULE, EXTENDED RELEASE ORAL ONCE
Refills: 0 | Status: DISCONTINUED | OUTPATIENT
Start: 2021-11-19 | End: 2021-11-19

## 2021-11-19 RX ORDER — ONDANSETRON 8 MG/1
4 TABLET, FILM COATED ORAL ONCE
Refills: 0 | Status: COMPLETED | OUTPATIENT
Start: 2021-11-19 | End: 2021-11-19

## 2021-11-19 RX ORDER — IBUPROFEN 200 MG
1 TABLET ORAL
Qty: 30 | Refills: 0
Start: 2021-11-19

## 2021-11-19 RX ADMIN — MORPHINE SULFATE 4 MILLIGRAM(S): 50 CAPSULE, EXTENDED RELEASE ORAL at 12:49

## 2021-11-19 RX ADMIN — ONDANSETRON 4 MILLIGRAM(S): 8 TABLET, FILM COATED ORAL at 12:48

## 2021-11-19 NOTE — ED PROVIDER NOTE - CARE PROVIDER_API CALL
Edgar Shipman)  Orthopaedic Surgery  [address], [address]  [place] [address]  Phone: [phone]  Fax: [phone]  Follow Up Time:

## 2021-11-19 NOTE — ED PROVIDER NOTE - INTERNATIONAL TRAVEL
Endoscope was pre-cleaned at bedside immediately following procedure by Fauzia HERRMANN Anesthesia reports 250mg Propofol, 40mg Lidocaine and 400mL NS given during procedure. Received report from anesthesia staff on vital signs and status of patient.
No

## 2021-11-19 NOTE — ED ADULT NURSE REASSESSMENT NOTE - NSIMPLEMENTINTERV_GEN_ALL_ED
Implemented All Fall Risk Interventions:  [place] to call system. Call bell, personal items and telephone within reach. Instruct patient to call for assistance. Room bathroom lighting operational. Non-slip footwear when patient is off stretcher. Physically safe environment: no spills, clutter or unnecessary equipment. Stretcher in lowest position, wheels locked, appropriate side rails in place. Provide visual cue, wrist band, yellow gown, etc. Monitor gait and stability. Monitor for mental status changes and reorient to person, place, and time. Review medications for side effects contributing to fall risk. Reinforce activity limits and safety measures with patient and family.

## 2021-11-19 NOTE — ED ADULT NURSE NOTE - OBJECTIVE STATEMENT
Pt was reaching up for something with right arm and felt immense pain - pt with hx of shoulder dislocation - pt was recommended for surgery but never had it -pt with limited movement and pain to right shoulder

## 2021-11-19 NOTE — ED ADULT NURSE NOTE - LOCATION
1.  Cataract OU ([facility] Component OU) -- Likely visually significant however patient wishes to observe for now. Observe for now without intervention. The patient was advised to contact us if any change or worsening of vision. 2.  KUNAL w/ PEK OU -- Cont ATs TID OU routinely. 3.  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 RD Precautions. 4.  S/p Laser Reintopexy OS -- H/o Retinal Tear OS MRx for glasses given to patient. Return for an appointment in 1 year 30/Glare with Dr. Yudith August. shoulder

## 2021-11-19 NOTE — ED PROVIDER NOTE - MUSCULOSKELETAL, MLM
pt has point tender to the r shoulder region with deformity and loss of contour, the rue has normal motor no wrist drop noskin changes

## 2021-11-19 NOTE — ED PROVIDER NOTE - PATIENT PORTAL LINK FT
You can access the FollowMyHealth Patient Portal offered by [facility] by registering at the following website: [email or web address]. By joining [employer]’s FollowMyHealth portal, you will also be able to view your health information using other applications (apps) compatible with our system.

## 2021-11-19 NOTE — ED PROVIDER NOTE - CONSTITUTIONAL, MLM
Well appearing, awake, alert, oriented to person, place, time/situation and uncomfortable with rue in abducted position guarding normal...

## 2021-11-19 NOTE — PROGRESS NOTE ADULT - SUBJECTIVE AND OBJECTIVE BOX
66y Female presents c/o R shoulder pain after reaching for something above her head. Patient has a history of recurrent R shoulder dislocations. Denies HS/LOC. Denies numbness/tingling. Denies fever/chills. Denies pain/injury elsewhere. No other complaints. Pt is Right**** hand dominant.    HEALTH ISSUES - PROBLEM Dx:        MEDICATIONS  (STANDING):    Allergies    aspirin (Other)    Intolerances                Vital Signs Last 24 Hrs  T(C): 36.2 (11-19-21 @ 12:24), Max: 36.2 (11-19-21 @ 12:24)  T(F): 97.2 (11-19-21 @ 12:24), Max: 97.2 (11-19-21 @ 12:24)  HR: 74 (11-19-21 @ 12:24) (74 - 74)  BP: 145/74 (11-19-21 @ 12:24) (145/74 - 145/74)  BP(mean): --  RR: 16 (11-19-21 @ 12:24) (16 - 16)  SpO2: 98% (11-19-21 @ 12:24) (98% - 98%)    Imaging: AP, Lateral Y xrays demonstrate: Anterior shoulder dislocation, no obvious fracture.    Physical Exam  Gen: NAD, Alert and Awake  Musculoskeletal:      RIGHT UE:  Arm is guarded. No open skin. No obvious deformities or other signs of trauma at elbow, forearm, wrist or hand.    +Palpable Sulcus Sign.   No bony TTP.   C5-T1 SILT  Motor grossly intact throughout axillary/musculocutaneous/radial/median/ulnar/AIN/PIN nerves  + radial pulse  Compartments soft and compressible      Procedure: Pt gave verbal consent to closed reduction under local injection. The shoulder was prepped and the sulcus was injected with 20cc plain 1% lidocaine. Time was allowed for anesthetic effect. The shoulder was then closed reduced with traction-countertraction technique. The pt tolerated well. Post reduction films confirmed relocation. Post reduction exam unchanged. PROM forward flexion adduction was felt to be smooth with no gross instability.  Pt more comfortable after the reduction.      A/P: 66y Female with Right shoulder Anterior Dislocation s/p reduction in the ED    Shoulder Immobilizer at all times  NWB RUE  Ice plenty  Follow up with Dr. Rodrigues as outpatient within 1 week, call office for appointment   Ortho stable  for DC  Discussed with Dr. Rodrigues, who agrees with above plan 66y Female presents c/o R shoulder pain after reaching for something above her head. Patient has a history of recurrent R shoulder dislocations. Denies HS/LOC. Denies numbness/tingling. Denies fever/chills. Denies pain/injury elsewhere. No other complaints. Pt is Right**** hand dominant.    HEALTH ISSUES - PROBLEM Dx:        MEDICATIONS  (STANDING):    Allergies    aspirin (Other)    Intolerances                Vital Signs Last 24 Hrs  T(C): 36.2 (11-19-21 @ 12:24), Max: 36.2 (11-19-21 @ 12:24)  T(F): 97.2 (11-19-21 @ 12:24), Max: 97.2 (11-19-21 @ 12:24)  HR: 74 (11-19-21 @ 12:24) (74 - 74)  BP: 145/74 (11-19-21 @ 12:24) (145/74 - 145/74)  BP(mean): --  RR: 16 (11-19-21 @ 12:24) (16 - 16)  SpO2: 98% (11-19-21 @ 12:24) (98% - 98%)    Imaging: AP, Lateral Y xrays demonstrate: Anterior shoulder dislocation, no obvious fracture.    Physical Exam  Gen: NAD, Alert and Awake  Musculoskeletal:      RIGHT UE:  Arm is guarded. No open skin. No obvious deformities or other signs of trauma at elbow, forearm, wrist or hand.    +Palpable Sulcus Sign.   No bony TTP.   C5-T1 SILT  Motor grossly intact throughout axillary/musculocutaneous/radial/median/ulnar/AIN/PIN nerves  + radial pulse  Compartments soft and compressible      Procedure: Pt gave verbal consent to closed reduction under local injection. The shoulder was prepped and the sulcus was injected with 20cc plain 1% lidocaine. Time was allowed for anesthetic effect. The shoulder was then closed reduced with traction-countertraction technique. The pt tolerated well. Post reduction films confirmed relocation. Post reduction exam unchanged. PROM forward flexion adduction was felt to be smooth with no gross instability.  Pt more comfortable after the reduction.      A/P: 66y Female with Right shoulder Anterior Dislocation s/p reduction in the ED    Shoulder in splint at all times  NWB RUE  Ice plenty  Follow up with Dr. Rodrigues as outpatient within 1 week, call office for appointment   Ortho stable  for DC  Discussed with Dr. Rodrigues, who agrees with above plan 66y Female presents c/o R shoulder pain after reaching for something above her head. Patient has a history of recurrent R shoulder dislocations. Denies HS/LOC. Denies numbness/tingling. Denies fever/chills. Denies pain/injury elsewhere. No other complaints. Pt is Right hand dominant.    HEALTH ISSUES - PROBLEM Dx:        MEDICATIONS  (STANDING):    Allergies    aspirin (Other)    Intolerances                Vital Signs Last 24 Hrs  T(C): 36.2 (11-19-21 @ 12:24), Max: 36.2 (11-19-21 @ 12:24)  T(F): 97.2 (11-19-21 @ 12:24), Max: 97.2 (11-19-21 @ 12:24)  HR: 74 (11-19-21 @ 12:24) (74 - 74)  BP: 145/74 (11-19-21 @ 12:24) (145/74 - 145/74)  BP(mean): --  RR: 16 (11-19-21 @ 12:24) (16 - 16)  SpO2: 98% (11-19-21 @ 12:24) (98% - 98%)    Imaging: AP, Lateral Y xrays demonstrate: Anterior shoulder dislocation, no obvious fracture.    Physical Exam  Gen: NAD, Alert and Awake  Musculoskeletal:      RIGHT UE:  Arm is guarded. No open skin. No obvious deformities or other signs of trauma at elbow, forearm, wrist or hand.    +Palpable Sulcus Sign.   No bony TTP.   C5-T1 SILT  Motor grossly intact throughout axillary/musculocutaneous/radial/median/ulnar/AIN/PIN nerves  + radial pulse  Compartments soft and compressible      Procedure: Pt gave verbal consent to closed reduction under local injection. The shoulder was prepped and the sulcus was injected with 20cc plain 1% lidocaine. Time was allowed for anesthetic effect. The shoulder was then closed reduced with traction-countertraction technique. The pt tolerated well. Post reduction films confirmed relocation. Post reduction exam unchanged. PROM forward flexion adduction was felt to be smooth with no gross instability.  Pt more comfortable after the reduction.      A/P: 66y Female with Right shoulder Anterior Dislocation s/p reduction in the ED    Shoulder in splint at all times  NWB RUE  Ice plenty  Follow up with Dr. Rodrigues as outpatient within 1 week, call office for appointment   Ortho stable  for DC  Discussed with Dr. Rodrigues, who agrees with above plan

## 2021-11-19 NOTE — ED PROVIDER NOTE - NSFOLLOWUPINSTRUCTIONS_ED_ALL_ED_FT
KEEP SLING ON   ICE FOR PAIN   IBUPROFEN FOR PAIN  FOLLOW UP WITH DR. JAMES-ORTHO ON CALL OR YOUR PERSONAL ORTHOPEDIST    Luxación de hombro    Shoulder Dislocation    La articulación del hombro está compuesta por 3 huesos:  •El hueso de la parte superior del brazo (húmero).      •El omóplato (escápula).      •La clavícula.      La luxación de hombro se produce cuando el hueso de la parte superior del brazo se desplaza de friedman ubicación normal en la articulación del hombro.      ¿Cuáles son las causas?  Las causas de esta afección suelen ser las siguientes:  •Yonathan caída.      •Un golpe valarie y directo en el hombro.      •Un movimiento brusco del hombro.        ¿Qué incrementa el riesgo?    Es más probable que tenga esta afección si practica deportes.      ¿Cuáles son los signos o los síntomas?     •Forma defectuosa (deformidad) del hombro.      •Dolor muy intenso.      •Imposibilidad de  un hombre.      •Adormecimiento, debilidad u hormigueo en el pat o el brazo.      •Moretón o hinchazón alrededor del hombro.        ¿Cómo se trata?  Esta afección se trata con un procedimiento llamado reducción. [place] se realiza para volver a colocar el hueso de la parte superior del brazo en la articulación. Existen dos tipos de reducción:  •Reducción cerrada. El hueso de la parte superior del brazo se vuelve a colocar en la articulación sin cirugía. El médico acomoda el hueso en friedman lugar con las ashley.    •Reducción abierta. Se realiza yonathan cirugía para volver a colocar el hueso de la parte superior del brazo en la articulación. Esta puede ser necesaria en los siguientes casos:  •Usted tiene debilidad en la articulación del hombro o en los tejidos que conectan los huesos entre ellos (ligamentos).      •Ha tenido más de yonathan luxación de hombro.      •Presenta daño en los nervios o los vasos sanguíneos que rodean al hombro.        Después del procedimiento, utilizará un dispositivo ortopédico o cabestrillo para evitar que el brazo se mueva.    Cuando le saquen el dispositivo ortopédico o cabestrillo, recibirá fisioterapia para ayudar a mejorar el movimiento (amplitud de movimiento) de la articulación del hombro.      Siga estas indicaciones en friedman casa:    Medicamentos     •Granby los medicamentos de venta nayla y los recetados solamente edward se lo haya indicado el médico.    •Consulte a friedman médico si el medicamento que le recetó:   •Hace que sea necesario no conducir ni usar maquinaria pesada.     •Puede provocarle dificultad para defecar (estreñimiento). Es posible que deba libia medidas para prevenir o tratar los problemas para defecar:  •Jillian suficiente líquido para mantener el pis (la orina) de color amarillo pálido.      •Granby medicamentos recetados o de venta nayla.      •Coma alimentos ricos en fibra. Entre ellos, frijoles, cereales integrales y frutas y verduras frescas.       •Limite los alimentos con alto contenido de grasa y azúcar. Estos incluyen alimentos fritos o dulces.          Si tiene un dispositivo ortopédico o cabestrillo:     •Use el dispositivo ortopédico o cabestrillo edward se lo haya indicado el médico. Quíteselo solamente edward se lo haya indicado el médico.    •Afloje el dispositivo ortopédico o el cabestrillo si los dedos:  •Hormiguean.      •Se adormecen.      •Se tornan fríos o de color vinny.        •Mantenga limpio el dispositivo ortopédico o el cabestrillo.    •Si el dispositivo ortopédico o el cabestrillo no es impermeable:  •No deje que se moje.      •Cúbralo con un envoltorio hermético cuando tome un baño de inmersión o yonathan ducha.          Control del dolor, la rigidez y la hinchazón    •Aplique hielo sobre la gabe lesionada, si se lo indican.  •Si tiene un dispositivo ortopédico o cabestrillo desmontable, quíteselo edward se lo haya indicado el médico.      •Ponga el hielo en yonathan bolsa plástica.      •Coloque yonathan toalla entre la piel y la bolsa.      •Coloque el hielo cam 20 minutos, 2 a 3 veces al día.        •Mueva los dedos con frecuencia.      •Cuando esté sentado o acostado, levante (eleve) la gabe lesionada por encima del nivel del corazón.      Actividad     • No levante el brazo por encima del hombro hasta que el médico lo autorice.      • No levante nada hasta que el médico le diga que puede hacerlo.      • No empuje ni tire de objetos hasta que el médico lo apruebe.      •Reanude daly actividades normales según lo indicado por el médico. Pregúntele al médico qué actividades son seguras para usted.      •Manjinder ejercicios de flexibilidad solamente edward se lo haya indicado el médico.      •Ejercite la mano apretando yonathan pelota blanda. [place] evitará que la mano y la justin se pongan rígidas e hinchen.      Indicaciones generales     • No conduzca mientras esté usando un dispositivo ortopédico o un cabestrillo en la mano que utiliza para conducir.      • No se dé joana de inmersión, no nade ni use el jacuzzi hasta que el médico lo apruebe. Pregunte al médico si puede ducharse. Harry vez solo le permitan darse joana de esponja.      • No consuma ningún producto que contenga tabaco, lo que incluye cigarrillos, tabaco de mascar o cigarrillos electrónicos. Estos pueden retrasar la recuperación. Si necesita ayuda para dejar de fumar, consulte al médico.      •Concurra a todas las visitas de seguimiento edward se lo haya indicado el médico. [place] es importante.        Comuníquese con un médico si:    •El dispositivo ortopédico o el cabestrillo se daña.        Solicite ayuda inmediatamente si:    •El dolor empeora en lugar de mejorar.      •Pierde la sensibilidad en el brazo o la mano.      •El brazo o la mano se ponen blancos y fríos.        Resumen    •La luxación de hombro se produce cuando el hueso de la parte superior del brazo se desplaza de friedman ubicación normal en la articulación del hombro.      •Generalmente es causada por yonathan caída, un golpe valarie en el hombro o un movimiento brusco del hombro.      •Causa un dolor muy intenso. Es posible que no pueda  el hombro.      •Esta afección se trata con reducción abierta o reducción cerrada. También le darán un dispositivo ortopédico o un cabestrillo. Deberá hacer ejercicios para mejorar la movilidad de la articulación del hombro.      •Comuníquese con un médico si el dispositivo ortopédico o el cabestrillo se daña. Solicite ayuda de inmediato si el dolor empeora, pierde la sensibilidad en el brazo o la mano, o el brazo o la mano se le ponen blancos o fríos.      Esta información no tiene edward fin reemplazar el consejo del médico. Asegúrese de hacerle al médico cualquier pregunta que tenga.

## 2021-11-19 NOTE — ED ADULT NURSE NOTE - CAS TRG GEN SKIN COLOR
Detail Level: Generalized Additional Notes: Discussed Elidel and 1-2 months of doxycycline as treatment.\\n\\nRecommend gentle cleansers and moisturizers, Cetaphil, CeraVe or Aveeno. Normal for race

## 2021-11-19 NOTE — ED ADULT NURSE REASSESSMENT NOTE - NS ED NURSE REASSESS COMMENT FT1
Pt d/jens home pt and family verbalized understanding of d/c instruction -pt was assisted to vehicle via wheelchair in stable condition - sling in place

## 2021-11-19 NOTE — ED PROVIDER NOTE - CLINICAL SUMMARY MEDICAL DECISION MAKING FREE TEXT BOX
pt has hx recurrent dislocation felt sudden pain and deformity after reaching above head with arm.   due for surgery not yet obtained  xray reveals dislocation   treat pain xray ortho consult

## 2021-11-19 NOTE — ED PROVIDER NOTE - OBJECTIVE STATEMENT
Pt  is a 67 yo f who has no sig pmhx pshx takes no meds other than she has a recurring dislocating r shoulder.  she was to have surgery but never did and since has moved to [place] from [place].  today she was reaching above her head and felt sudden pain in r shoulder as if it were dislocated again. bib Daughter in law for evaluation.  no numbness tingling or motor weakness  no other injury

## 2022-10-31 NOTE — ED ADULT NURSE NOTE - OBJECTIVE STATEMENT
Refill approval rec'd from MITESH Rae.   Pt presents to the fast track area s/p right shoulder dislocation after reaching for an item. Pt states" I dislocated my right shoulder 8 months ago. Skin warm and dry, + sensation, + capillary refill < 2 sec, + mobility of the fingers, limited ROM of the right extremity. Pt presents to the fast track area s/p right shoulder dislocation after reaching for an item. Pt states" I dislocated my right shoulder 8 months ago. Skin warm and dry, + sensation, + capillary refill < 2 sec, + mobility of the fingers, limited ROM of the right extremity, all pulses are palpable.

## 2022-12-09 NOTE — ED PROVIDER NOTE - CARDIAC, MLM
PATIENT IS WANTING TO GET SCHEDULED FOR A MRI. PLEASE ADVISE. Normal rate, regular rhythm.  Heart sounds S1, S2.  No murmurs, rubs or gallops. good distal pulses and cap refill

## 2023-01-24 NOTE — ED ADULT NURSE NOTE - CAS DISCH TRANSFER METHOD
Reviewed with daughter, subcentimeter nodules will need outpatient followup as outpatient--previously followed by Dr. Wale Huffman. Private car

## 2023-09-28 NOTE — ED PROVIDER NOTE - CHIEF COMPLAINT
The patient is a 63y Female complaining of pain, shoulder. Size Of Lesion In Cm (Optional): 0 Detail Level: Zone

## 2024-02-27 NOTE — ED PROVIDER NOTE - CHPI ED SYMPTOMS NEG
Patient said she's to have another x-ray of the ribs before returning to work on 3/12/24.   no weakness/no numbness